# Patient Record
Sex: MALE | Race: WHITE | NOT HISPANIC OR LATINO | Employment: OTHER | URBAN - METROPOLITAN AREA
[De-identification: names, ages, dates, MRNs, and addresses within clinical notes are randomized per-mention and may not be internally consistent; named-entity substitution may affect disease eponyms.]

---

## 2017-01-13 ENCOUNTER — OFFICE VISIT (OUTPATIENT)
Dept: CARDIOLOGY | Facility: CLINIC | Age: 57
End: 2017-01-13

## 2017-01-13 VITALS
HEART RATE: 87 BPM | BODY MASS INDEX: 31.39 KG/M2 | DIASTOLIC BLOOD PRESSURE: 60 MMHG | SYSTOLIC BLOOD PRESSURE: 124 MMHG | HEIGHT: 71 IN | WEIGHT: 224.2 LBS

## 2017-01-13 DIAGNOSIS — Z95.5 HISTORY OF CORONARY ARTERY STENT PLACEMENT: Primary | ICD-10-CM

## 2017-01-13 PROCEDURE — 93000 ELECTROCARDIOGRAM COMPLETE: CPT | Performed by: INTERNAL MEDICINE

## 2017-01-13 PROCEDURE — 99213 OFFICE O/P EST LOW 20 MIN: CPT | Performed by: INTERNAL MEDICINE

## 2017-01-13 NOTE — MR AVS SNAPSHOT
Doug Harper   1/13/2017 12:00 PM   Office Visit    Dept Phone:  [phone]   Encounter #:  [id number]    Provider:  Brandon Pino MD   Department:  [facility] CARDIOLOGY                Your Full Care Plan              Your Updated Medication List          This list is accurate as of: 1/13/17  1:21 PM.  Always use your most recent med list.                aspirin 81 MG tablet       atorvastatin 80 MG tablet   Commonly known as:  LIPITOR   Take 1 tablet by mouth every night.       carvedilol 6.25 MG tablet   Commonly known as:  COREG   TAKE ONE TABLET BY MOUTH TWICE A DAY WITH MEALS       clopidogrel 75 MG tablet   Commonly known as:  PLAVIX   TAKE ONE TABLET BY MOUTH DAILY       gabapentin 300 MG capsule   Commonly known as:  NEURONTIN       insulin detemir 100 UNIT/ML injection   Commonly known as:  LEVEMIR   Inject 70 Units under the skin daily.       Insulin Lispro 100 UNIT/ML solution pen-injector   Commonly known as:  HUMALOG KWIKPEN   TAKE 30 UNITS EACH MEAL 3 TIMES DAILY       lisinopril 5 MG tablet   Commonly known as:  PRINIVIL,ZESTRIL   TAKE ONE TABLET BY MOUTH DAILY       metFORMIN 1000 MG tablet   Commonly known as:  GLUCOPHAGE       nitroglycerin 0.4 MG SL tablet   Commonly known as:  NITROSTAT   1 under the tongue as needed for angina, may repeat q5mins for up three doses       ONE TOUCH ULTRA TEST test strip   Generic drug:  glucose blood       SYMBICORT 160-4.5 MCG/ACT inhaler   Generic drug:  budesonide-formoterol               Instructions     None    Patient Instructions History      Upcoming Appointments     Visit Type Date Time Department    FOLLOW UP 1/13/2017 12:00 PM [facility]    FOLLOW UP 1/12/2018  1:40 PM [facility] Signup     Our records indicate that your [employer] account has been deactivated. If you would like to reactivate your account, please email [email or web address] or call  "[phone] to talk to our [facility] staff.             Other Info from Your Visit           Your Appointments     Jan 12, 2018  1:40 PM EST   Follow Up with Brandon Pino MD   [facility] CARDIOLOGY (--)    [address]  [facility] [id number] [phone]           Arrive 15 minutes prior to appointment.              Allergies     Penicillins        Reason for Visit     Coronary Artery Disease           Vital Signs     Blood Pressure Pulse Height Weight Body Mass Index Smoking Status    124/60 87 71\" (180.3 cm) 224 lb 3.2 oz (102 kg) 31.27 kg/m2 Never Smoker        "

## 2017-01-13 NOTE — PROGRESS NOTES
Date of Office Visit: 2017  Encounter Provider: Brandon Pino MD  Place of Service: [facility] CARDIOLOGY  Patient Name: Doug Harper  :1960  [id number]    Chief Complaint   Patient presents with   • Coronary Artery Disease   :     HPI: Doug Harper is a 56 y.o. male   He is here for follow-up of his coronary disease.  He is a gentleman that had a non-STEMI in 2015.  His diabetes was out of control with an A1c at 10.  He had a catheterization done with normal global left ventricular function, inferoapical hypokinesis, calcium in his coronaries, 30% distal left main disease, diffuse disease in all off his coronaries, two 90% lesions in his mid-left anterior descending, 80% apical left anterior descending too small to intervene on, and 90% mid-circumflex.  His right coronary artery had luminal irregularities with 50% posterolateral artery disease.  At that time, we decided the best option would be multivessel drug-eluting stenting.  He had a 3.0 mm x 23 mm Xience to the mid-circumflex, 2.5 mm x 38 mm Xience to the mid-left anterior descending, two 2.58 mm Xience in a distal left anterior descending, and a 2.5 mm x 18 mm Xience in the mid-left anterior descending.  He has done very well since then.  He is not having chest pain, no shortness of breath, no paroxysmal nocturnal dyspnea, no orthopnea, no edema, and no syncope.          Past Medical History   Diagnosis Date   • AP (angina pectoris)    • CAD (coronary artery disease)      involving native coronary artery  of native heart wiith angina pectoris   • Diabetes mellitus    • ULLOA (dyspnea on exertion)    • Dyslipidemia    • Health care maintenance    • Hyperlipidemia    • Hypertension    • Non-ST elevated myocardial infarction        Past Surgical History   Procedure Laterality Date   • Coronary stent placement       placed in the mid distal LAD; stent placed to left circumflex.    • Cardiac catheterization       30%  distal left main, 40% diffuse LAD, 80% apical LAD lesion, 30% circumflex as well as 90% mid circuflex, dominant RCA with 30% stenosis, and 20-30% PDA.        Social History     Social History   • Marital status:      Spouse name: N/A   • Number of children: N/A   • Years of education: N/A     Occupational History   • Not on file.     Social History Main Topics   • Smoking status: Never Smoker   • Smokeless tobacco: Not on file   • Alcohol use No   • Drug use: No   • Sexual activity: Defer     Other Topics Concern   • Not on file     Social History Narrative       Family History   Problem Relation Age of Onset   • Heart failure Mother    • Hypertension Mother    • Diabetes Mother    • Heart disease Father    • Hypertension Father    • Diabetes Father    • Coronary artery disease Brother    • Hypertension Brother    • Diabetes Brother        Review of Systems   Constitution: Negative for decreased appetite, fever, malaise/fatigue and weight loss.   HENT: Negative for nosebleeds.    Eyes: Negative for double vision.   Cardiovascular: Negative for chest pain, claudication, cyanosis, dyspnea on exertion, irregular heartbeat, leg swelling, near-syncope, orthopnea, palpitations, paroxysmal nocturnal dyspnea and syncope.   Respiratory: Negative for cough, hemoptysis and shortness of breath.    Hematologic/Lymphatic: Negative for bleeding problem.   Skin: Negative for rash.   Musculoskeletal: Negative for falls and myalgias.   Gastrointestinal: Negative for hematochezia, jaundice, melena, nausea and vomiting.   Genitourinary: Negative for hematuria.   Neurological: Negative for dizziness and seizures.   Psychiatric/Behavioral: Negative for altered mental status and memory loss.       Allergies   Allergen Reactions   • Penicillins          Current Outpatient Prescriptions:   •  aspirin 81 MG tablet, Take 81 mg by mouth daily., Disp: , Rfl:   •  atorvastatin (LIPITOR) 80 MG tablet, Take 1 tablet by mouth every night.,  "Disp: 30 tablet, Rfl: 5  •  carvedilol (COREG) 6.25 MG tablet, TAKE ONE TABLET BY MOUTH TWICE A DAY WITH MEALS, Disp: 60 tablet, Rfl: 1  •  clopidogrel (PLAVIX) 75 MG tablet, TAKE ONE TABLET BY MOUTH DAILY, Disp: 90 tablet, Rfl: 3  •  gabapentin (NEURONTIN) 300 MG capsule, , Disp: , Rfl:   •  insulin detemir (LEVEMIR) 100 UNIT/ML injection, Inject 70 Units under the skin daily., Disp: 30 mL, Rfl: 4  •  Insulin Lispro, Human, (HUMALOG KWIKPEN) 100 UNIT/ML solution pen-injector, TAKE 30 UNITS EACH MEAL 3 TIMES DAILY, Disp: 30 mL, Rfl: 5  •  lisinopril (PRINIVIL,ZESTRIL) 5 MG tablet, TAKE ONE TABLET BY MOUTH DAILY, Disp: 90 tablet, Rfl: 3  •  metFORMIN (GLUCOPHAGE) 1000 MG tablet, Take 1,000 mg by mouth 2 (Two) Times a Day With Meals., Disp: , Rfl:   •  nitroglycerin (NITROSTAT) 0.4 MG SL tablet, 1 under the tongue as needed for angina, may repeat q5mins for up three doses (Patient taking differently: Place 0.4 mg under the tongue every 5 (five) minutes as needed. 1 under the tongue as needed for angina, may repeat q5mins for up three doses), Disp: 100 tablet, Rfl: 11  •  ONE TOUCH ULTRA TEST test strip, , Disp: , Rfl:   •  SYMBICORT 160-4.5 MCG/ACT inhaler, , Disp: , Rfl:      Objective:     Vitals:    01/13/17 1252   BP: 124/60   Pulse: 87   Weight: 224 lb 3.2 oz (102 kg)   Height: 71\" (180.3 cm)     Body mass index is 31.27 kg/(m^2).    Physical Exam   Constitutional: He is oriented to person, place, and time. He appears well-developed and well-nourished.   HENT:   Head: Normocephalic.   Eyes: No scleral icterus.   Neck: No JVD present. No thyromegaly present.   Cardiovascular: Normal rate, regular rhythm and normal heart sounds.  Exam reveals no gallop and no friction rub.    No murmur heard.  Pulmonary/Chest: Effort normal and breath sounds normal. He has no wheezes. He has no rales.   Abdominal: Soft. There is no hepatosplenomegaly. There is no tenderness.   Musculoskeletal: Normal range of motion. He exhibits no " edema.   Lymphadenopathy:     He has no cervical adenopathy.   Neurological: He is alert and oriented to person, place, and time.   Skin: Skin is warm and dry. No rash noted.   Psychiatric: He has a normal mood and affect.         ECG 12 Lead  Date/Time: 1/13/2017 1:24 PM  Performed by: IGNACIA PINO  Authorized by: IGNACIA PINO   Comparison: compared with previous ECG   Rhythm: sinus rhythm  Clinical impression: normal ECG             Assessment:       Diagnosis Plan   1. History of coronary artery stent placement            Plan:        He is doing pretty well.  I am going to keep him on his Plavix long-term unless he has a bleeding problem.  I would like him to try and lose another 15 pounds and stay on his other medications.  I will see him back in a year or sooner if he has trouble.      Coronary Artery Disease  Assessment  • The patient has no angina    Plan  • Lifestyle modifications discussed include adhering to a heart healthy diet, maintenance of a healthy weight, medication compliance, regular exercise and regular monitoring of cholesterol and blood pressure    Subjective - Objective  • There is a history of past MI  • There has been a previous stent procedure using JAXON 12/15    • Current antiplatelet therapy includes aspirin 81 mg and clopidogrel 75 mg        As always, it has been a pleasure to participate in your patient's care.      Sincerely,       Ignacia Pino MD

## 2018-09-07 ENCOUNTER — APPOINTMENT (OUTPATIENT)
Dept: CT IMAGING | Facility: HOSPITAL | Age: 58
End: 2018-09-07

## 2018-09-07 ENCOUNTER — HOSPITAL ENCOUNTER (EMERGENCY)
Facility: HOSPITAL | Age: 58
Discharge: HOME | End: 2018-09-07
Attending: EMERGENCY MEDICINE | Admitting: EMERGENCY MEDICINE

## 2018-09-07 VITALS
HEIGHT: 71 IN | TEMPERATURE: 97.5 F | RESPIRATION RATE: 18 BRPM | BODY MASS INDEX: 31.08 KG/M2 | SYSTOLIC BLOOD PRESSURE: 150 MMHG | HEART RATE: 73 BPM | DIASTOLIC BLOOD PRESSURE: 82 MMHG | OXYGEN SATURATION: 97 % | WEIGHT: 222 LBS

## 2018-09-07 DIAGNOSIS — M54.41 ACUTE RIGHT-SIDED LOW BACK PAIN WITH RIGHT-SIDED SCIATICA: Primary | ICD-10-CM

## 2018-09-07 DIAGNOSIS — M51.26 HERNIATED INTERVERTEBRAL DISC OF LUMBAR SPINE: ICD-10-CM

## 2018-09-07 DIAGNOSIS — M48.00 CENTRAL STENOSIS OF SPINAL CANAL: ICD-10-CM

## 2018-09-07 PROCEDURE — 99284 EMERGENCY DEPT VISIT MOD MDM: CPT

## 2018-09-07 PROCEDURE — 99284 EMERGENCY DEPT VISIT MOD MDM: CPT | Performed by: EMERGENCY MEDICINE

## 2018-09-07 PROCEDURE — 25010000002 METHYLPREDNISOLONE PER 40 MG: Performed by: EMERGENCY MEDICINE

## 2018-09-07 PROCEDURE — 96372 THER/PROPH/DIAG INJ SC/IM: CPT

## 2018-09-07 PROCEDURE — 72131 CT LUMBAR SPINE W/O DYE: CPT

## 2018-09-07 PROCEDURE — 25010000002 KETOROLAC TROMETHAMINE PER 15 MG: Performed by: EMERGENCY MEDICINE

## 2018-09-07 RX ORDER — CYCLOBENZAPRINE HCL 10 MG
10 TABLET ORAL ONCE
Status: COMPLETED | OUTPATIENT
Start: 2018-09-07 | End: 2018-09-07

## 2018-09-07 RX ORDER — ROSUVASTATIN CALCIUM 20 MG/1
20 TABLET, COATED ORAL DAILY
COMMUNITY

## 2018-09-07 RX ORDER — PREDNISONE 10 MG/1
TABLET ORAL
Qty: 21 TABLET | Refills: 0 | Status: SHIPPED | OUTPATIENT
Start: 2018-09-07

## 2018-09-07 RX ORDER — OXYCODONE HYDROCHLORIDE AND ACETAMINOPHEN 5; 325 MG/1; MG/1
1 TABLET ORAL EVERY 6 HOURS PRN
Qty: 16 TABLET | Refills: 0 | Status: SHIPPED | OUTPATIENT
Start: 2018-09-07

## 2018-09-07 RX ORDER — CHLORAL HYDRATE 500 MG
1 CAPSULE ORAL DAILY
COMMUNITY

## 2018-09-07 RX ORDER — NABUMETONE 500 MG/1
500 TABLET, FILM COATED ORAL 2 TIMES DAILY PRN
Qty: 14 TABLET | Refills: 0 | Status: SHIPPED | OUTPATIENT
Start: 2018-09-07 | End: 2018-09-14

## 2018-09-07 RX ORDER — POTASSIUM CHLORIDE 750 MG/1
10 TABLET, EXTENDED RELEASE ORAL DAILY
COMMUNITY
Start: 2018-03-05

## 2018-09-07 RX ORDER — CYCLOBENZAPRINE HCL 10 MG
10 TABLET ORAL 3 TIMES DAILY PRN
Qty: 21 TABLET | Refills: 0 | Status: SHIPPED | OUTPATIENT
Start: 2018-09-07 | End: 2018-09-14

## 2018-09-07 RX ORDER — OXYCODONE HYDROCHLORIDE AND ACETAMINOPHEN 5; 325 MG/1; MG/1
2 TABLET ORAL ONCE
Status: COMPLETED | OUTPATIENT
Start: 2018-09-07 | End: 2018-09-07

## 2018-09-07 RX ORDER — KETOROLAC TROMETHAMINE 30 MG/ML
60 INJECTION, SOLUTION INTRAMUSCULAR; INTRAVENOUS ONCE
Status: COMPLETED | OUTPATIENT
Start: 2018-09-07 | End: 2018-09-07

## 2018-09-07 RX ORDER — METHYLPREDNISOLONE SODIUM SUCCINATE 40 MG/ML
80 INJECTION, POWDER, LYOPHILIZED, FOR SOLUTION INTRAMUSCULAR; INTRAVENOUS ONCE
Status: COMPLETED | OUTPATIENT
Start: 2018-09-07 | End: 2018-09-07

## 2018-09-07 RX ADMIN — METHYLPREDNISOLONE SODIUM SUCCINATE 80 MG: 40 INJECTION, POWDER, FOR SOLUTION INTRAMUSCULAR; INTRAVENOUS at 17:52

## 2018-09-07 RX ADMIN — CYCLOBENZAPRINE HYDROCHLORIDE 10 MG: 10 TABLET, FILM COATED ORAL at 17:29

## 2018-09-07 RX ADMIN — KETOROLAC TROMETHAMINE 60 MG: 30 INJECTION, SOLUTION INTRAMUSCULAR at 17:30

## 2018-09-07 RX ADMIN — OXYCODONE HYDROCHLORIDE AND ACETAMINOPHEN 2 TABLET: 5; 325 TABLET ORAL at 17:29

## 2018-09-07 NOTE — ED PROVIDER NOTES
Subjective   MR. Doug Harper is a 57 yo WM present secondary to low back pain.  Patient injured his low back on Thursday.  The first injury occurred while he was trying to pry a loose from the trailer.  He was using a piece of metal as a fulcrum.  The metal snapped in half.  Patient twisted his back resulting pain in his right lumbar region radiating to the posterior and lateral aspect of his right leg.  Patient reports later that day he was bent over behind the front seat of a vehicle.  He had difficulty getting out of the vehicle.  He again twisted his back.  He is had pain since the initial injury.  This simply worsened his symptoms.  Patient had a couple of hydrocodone left over from his bypass surgery in March.  He took these without any significant improvement.  Ibuprofen also not helpful.  Patient presents for evaluation.        History provided by:  Patient  Back Pain   Location:  Lumbar spine  Quality:  Shooting  Radiates to:  R posterior upper leg  Pain severity:  Severe  Onset quality:  Sudden  Duration:  8 days  Timing:  Constant  Progression:  Worsening  Chronicity:  New  Context comment:  As described above.    Relieved by:  Nothing  Worsened by:  Bending and twisting  Ineffective treatments:  NSAIDs  Associated symptoms: leg pain    Associated symptoms: no abdominal pain, no abdominal swelling, no bladder incontinence, no bowel incontinence, no chest pain, no dysuria, no fever, no headaches, no numbness, no paresthesias, no perianal numbness, no tingling and no weakness    Risk factors: no hx of cancer and no hx of osteoporosis        Review of Systems   Constitutional: Negative for chills, diaphoresis and fever.   HENT: Negative for congestion, ear pain and sore throat.    Eyes: Negative for pain and discharge.   Respiratory: Negative for chest tightness, shortness of breath, wheezing and stridor.    Cardiovascular: Negative for chest pain, palpitations and leg swelling.   Gastrointestinal: Negative  for abdominal pain, bowel incontinence, diarrhea, nausea and vomiting.   Genitourinary: Negative for bladder incontinence, dysuria, flank pain, frequency and hematuria.   Musculoskeletal: Positive for back pain. Negative for myalgias, neck pain and neck stiffness.   Skin: Negative for color change, pallor and rash.   Neurological: Negative for dizziness, tingling, seizures, syncope, weakness, numbness, headaches and paresthesias.   Psychiatric/Behavioral: Negative for agitation and confusion. The patient is not nervous/anxious.    All other systems reviewed and are negative.      Past Medical History:   Diagnosis Date   • AP (angina pectoris) (CMS/[facility], [facility])    • CAD (coronary artery disease)     involving native coronary artery  of native heart wiith angina pectoris   • Diabetes mellitus (CMS/[facility])    • ULLOA (dyspnea on exertion)    • Dyslipidemia    • Health care maintenance    • Hyperlipidemia    • Hypertension    • Non-ST elevated myocardial infarction (CMS/[facility])        Allergies   Allergen Reactions   • Penicillins Swelling     With a rash       Past Surgical History:   Procedure Laterality Date   • CARDIAC CATHETERIZATION      30% distal left main, 40% diffuse LAD, 80% apical LAD lesion, 30% circumflex as well as 90% mid circuflex, dominant RCA with 30% stenosis, and 20-30% PDA.    • CORONARY STENT PLACEMENT      placed in the mid distal LAD; stent placed to left circumflex.        Family History   Problem Relation Age of Onset   • Heart failure Mother    • Hypertension Mother    • Diabetes Mother    • Heart disease Father    • Hypertension Father    • Diabetes Father    • Coronary artery disease Brother    • Hypertension Brother    • Diabetes Brother        Social History     Social History   • Marital status:      Social History Main Topics   • Smoking status: Never Smoker   • Alcohol use No   • Drug use: No   • Sexual activity: Defer     Other Topics Concern   • Not on file           Objective   Physical  Exam   Constitutional: He is oriented to person, place, and time. He appears well-developed and well-nourished. No distress.   HENT:   Head: Normocephalic and atraumatic.   Right Ear: External ear normal.   Left Ear: External ear normal.   Nose: Nose normal.   Mouth/Throat: Oropharynx is clear and moist.   Eyes: Pupils are equal, round, and reactive to light. Conjunctivae and EOM are normal.   Neck: Normal range of motion. Neck supple.   Cardiovascular: Normal rate, regular rhythm, normal heart sounds and intact distal pulses.  Exam reveals no gallop and no friction rub.    No murmur heard.  Pulmonary/Chest: Effort normal and breath sounds normal. No stridor. No respiratory distress. He has no wheezes. He has no rales.   Abdominal: Soft. He exhibits no distension. There is no tenderness.   Musculoskeletal: He exhibits no edema.        Lumbar back: He exhibits decreased range of motion, tenderness, bony tenderness and pain. He exhibits no swelling, no edema and no deformity.        Back:    Neurological: He is alert and oriented to person, place, and time. He has normal reflexes. No cranial nerve deficit.   Skin: Skin is warm and dry. No rash noted. He is not diaphoretic. No erythema.   Psychiatric: He has a normal mood and affect. His behavior is normal.   Nursing note and vitals reviewed.      Procedures           ED Course  ED Course as of Sep 08 0146   Fri Sep 07, 2018   1715 Patient has low back pain with sciatic features.  Acute onset.  Will give IM Solu-Medrol, Toradol, by mouth muscle relaxant and pain medication.  Getting CT of L-spine.  [SS]   1910 Patient is feeling better.  CT of L-spine shows L4-L5 broad-based disc herniation as well as moderate to severe spinal stenosis.  Mild spinal stenosis L1 through L4.  I discussed these results with patient and family member.  Will treat with muscle relaxants, steroids, NSAIDs and other pain medications.  Recommend patient get outpatient MRI as well as follow-up  with neurosurgery.  Patient's wife has seen Dr. York.  Will provide is contact information.  [SS]      ED Course User Index  [SS] Duog Wilkins MD      My differential diagnosis for back pain includes but is not limited to:  Musculoskeletal strain, contusion, retroperitoneal hematoma, disc protrusion, vertebral fracture, transverse process fracture, rib fracture, facet syndrome, sacroiliac joint strain, sciatica, renal injury, splenic injury, pancreatic injury, osteoarthritis, lumbar spondylosis, spinal stenosis, ankylosing spondylitis, sacroiliac joint inflammation, pancreatitis, perforated peptic ulcer, diverticulitis, endometriosis, chronic PID, epidural abscess, osteomyelitis, retroperitoneal abscess, pyelonephritis, pneumonia, subphrenic abscess, tuberculosis, neurofibroma, meningioma, multiple myeloma, lymphoma, metastatic cancer, primary cancer, AAA, aortic dissection, spinal ischemia, referred pain, ureterolithiasis            MDM  Number of Diagnoses or Management Options  Acute right-sided low back pain with right-sided sciatica: new and requires workup  Central stenosis of spinal canal: new and requires workup  Herniated intervertebral disc of lumbar spine: new and requires workup  Risk of Complications, Morbidity, and/or Mortality  Presenting problems: moderate  Diagnostic procedures: high  Management options: moderate    Patient Progress  Patient progress: improved        Final diagnoses:   Acute right-sided low back pain with right-sided sciatica   Herniated intervertebral disc of lumbar spine   Central stenosis of spinal canal            Doug Wilkins MD  09/08/18 0146

## 2018-09-07 NOTE — DISCHARGE INSTRUCTIONS
Medications as directed.  Apply ice to improve pain.  Apply heat for muscle relaxation.  Gentle stretching.  Follow-up with Dr. Harvey to arrange outpatient MRI.  Follow-up with Dr. York as above.  Return to ED for worsening symptoms, medical emergencies.

## 2022-06-11 ENCOUNTER — APPOINTMENT (OUTPATIENT)
Dept: GENERAL RADIOLOGY | Facility: HOSPITAL | Age: 62
End: 2022-06-11

## 2022-06-11 ENCOUNTER — HOSPITAL ENCOUNTER (EMERGENCY)
Facility: HOSPITAL | Age: 62
Discharge: HOME | End: 2022-06-11
Attending: EMERGENCY MEDICINE | Admitting: EMERGENCY MEDICINE

## 2022-06-11 VITALS
TEMPERATURE: 98.4 F | HEART RATE: 78 BPM | OXYGEN SATURATION: 97 % | HEIGHT: 71 IN | BODY MASS INDEX: 29.96 KG/M2 | DIASTOLIC BLOOD PRESSURE: 74 MMHG | SYSTOLIC BLOOD PRESSURE: 134 MMHG | RESPIRATION RATE: 16 BRPM | WEIGHT: 214 LBS

## 2022-06-11 DIAGNOSIS — R07.89 CHEST WALL PAIN: Primary | ICD-10-CM

## 2022-06-11 PROCEDURE — 99282 EMERGENCY DEPT VISIT SF MDM: CPT | Performed by: EMERGENCY MEDICINE

## 2022-06-11 PROCEDURE — 99282 EMERGENCY DEPT VISIT SF MDM: CPT

## 2022-06-11 PROCEDURE — 71101 X-RAY EXAM UNILAT RIBS/CHEST: CPT

## 2022-06-11 RX ORDER — LIDOCAINE 50 MG/G
1 PATCH TOPICAL EVERY 24 HOURS
Qty: 6 PATCH | Refills: 0 | Status: SHIPPED | OUTPATIENT
Start: 2022-06-11

## 2022-06-11 NOTE — ED PROVIDER NOTES
Subjective   History of Present Illness  62-year-old male presents emergency room complaining of chest wall pain on the right upper chest wall.  He says he fell last week and landed on it has been hurting ever since, pain is worse when he takes a deep breath, when he coughs or when he lifts his arm above his head.  Denies shortness of breath.  Review of Systems   Constitutional: Negative for chills and fever.   Respiratory: Negative for choking, chest tightness and shortness of breath.    Neurological: Negative for dizziness and weakness.       Past Medical History:   Diagnosis Date   • AP (angina pectoris) (CMS/[facility])    • CAD (coronary artery disease)     involving native coronary artery  of native heart wiith angina pectoris   • Diabetes mellitus (CMS/[facility])    • ULLOA (dyspnea on exertion)    • Dyslipidemia    • Health care maintenance    • Hyperlipidemia    • Hypertension    • Non-ST elevated myocardial infarction (CMS/[facility])        Allergies   Allergen Reactions   • Penicillins Swelling     With a rash       Past Surgical History:   Procedure Laterality Date   • CARDIAC CATHETERIZATION      30% distal left main, 40% diffuse LAD, 80% apical LAD lesion, 30% circumflex as well as 90% mid circuflex, dominant RCA with 30% stenosis, and 20-30% PDA.    • CORONARY STENT PLACEMENT      placed in the mid distal LAD; stent placed to left circumflex.        Family History   Problem Relation Age of Onset   • Heart failure Mother    • Hypertension Mother    • Diabetes Mother    • Heart disease Father    • Hypertension Father    • Diabetes Father    • Coronary artery disease Brother    • Hypertension Brother    • Diabetes Brother        Social History     Socioeconomic History   • Marital status:    Tobacco Use   • Smoking status: Never Smoker   Substance and Sexual Activity   • Alcohol use: No   • Drug use: No   • Sexual activity: Defer           Objective    ED Triage Vitals [06/11/22 1041]   Temp Heart Rate Resp BP SpO2    98.4 °F (36.9 °C) 79 16 148/91 96 %      Temp src Heart Rate Source Patient Position BP Location FiO2 (%)   Oral Monitor -- -- --       Physical Exam  INITIAL VITAL SIGNS: Reviewed by me.  Pulse ox normal  GENERAL: Alert. Well developed and well nourished. No respiratory distress.  HEAD: Normocephalic.   EYES: No conjunctival injection.  ENT: Oral mucosa is moist.  NECK: Supple. Full range of motion.  RESPIRATORY: Non-labored respirations. Right chest wall ttp without crepitus or bruising.  EXTREMITIES: No deformity.  SKIN: Warm and dry. No rashes. No diaphoresis.  NEUROLOGIC: Alert. Normal gait  XR Ribs Right With PA Chest    Result Date: 6/11/2022  CR Ribs 2 Vws W Chest RT INDICATION: Fell with right-sided chest pain. Fell one week ago. Right anterior rib pain. History of cardiac surgery COMPARISON: Chest x-ray 5/23/2016. FINDINGS: PA view of the chest and 4 different oblique views of the right ribs. 5 total images. Chest x-ray: Interval postoperative changes to the heart. Heart size is borderline. No acute congestive heart failure. No acute infiltrate pleural effusion or pneumothorax. Right RIBS: No displaced right rib fracture.     1. Postoperative heart with borderline cardiac enlargement. 2. Otherwise no active disease seen in the chest. 3. No displaced right rib fracture. Signer Name: Shreya Mcdaniels MD  Signed: 6/11/2022 11:30 AM  Workstation Name: [facility]  [facility]      Procedures           ED Course                                                 MDM  Patient with a fall a week ago complaining of chest wall pain is worse with coughing, deep breathing.  Normal vital, no crepitus or ecchymosis.  xr without ptx or rib fx  Final diagnoses:   Chest wall pain       ED Disposition  ED Disposition     ED Disposition   Discharge    Condition   Good    Comment   --             Chace Harvey MD  [address] [facility] [address] [phone]      To schedule follow-up  appointment         Medication List      New Prescriptions    lidocaine 5 %  Commonly known as: LIDODERM  Place 1 patch on the skin as directed by provider Daily. Remove & Discard patch within 12 hours or as directed by MD        Changed    atorvastatin 80 MG tablet  Commonly known as: LIPITOR  Take 1 tablet by mouth every night.  What changed: how much to take     lisinopril 5 MG tablet  Commonly known as: PRINIVIL,ZESTRIL  TAKE ONE TABLET BY MOUTH DAILY  What changed:   · how much to take  · how to take this  · when to take this     nitroglycerin 0.4 MG SL tablet  Commonly known as: NITROSTAT  1 under the tongue as needed for angina, may repeat q5mins for up three doses  What changed:   · how much to take  · how to take this  · when to take this  · reasons to take this           Where to Get Your Medications      [place] [address] [place] [facility] [address] [facility] - [phone] [facility] [phone] [address] [address]    Phone: [phone]   · lidocaine 5 %          Dao Rowe MD  06/11/22 6750

## 2022-06-11 NOTE — DISCHARGE INSTRUCTIONS
Your chest x-ray showed no evidence for rib fracture and no pneumothorax.  Take tylenol according to label instructions. Use lidoderm patches as directed. Please follow up with your primary care physician.  Return to the emergency room if you develop difficulty breathing, uncontrolled vomiting or for any other concerns.